# Patient Record
Sex: MALE | Race: BLACK OR AFRICAN AMERICAN | NOT HISPANIC OR LATINO
[De-identification: names, ages, dates, MRNs, and addresses within clinical notes are randomized per-mention and may not be internally consistent; named-entity substitution may affect disease eponyms.]

---

## 2017-01-31 PROBLEM — Z00.00 ENCOUNTER FOR PREVENTIVE HEALTH EXAMINATION: Status: ACTIVE | Noted: 2017-01-31

## 2017-02-01 ENCOUNTER — APPOINTMENT (OUTPATIENT)
Dept: INTERNAL MEDICINE | Facility: CLINIC | Age: 82
End: 2017-02-01

## 2018-01-24 ENCOUNTER — EMERGENCY (EMERGENCY)
Facility: HOSPITAL | Age: 83
LOS: 0 days | Discharge: HOME | End: 2018-01-24

## 2018-01-24 DIAGNOSIS — N39.0 URINARY TRACT INFECTION, SITE NOT SPECIFIED: ICD-10-CM

## 2018-01-24 DIAGNOSIS — D64.9 ANEMIA, UNSPECIFIED: ICD-10-CM

## 2018-01-24 DIAGNOSIS — K29.70 GASTRITIS, UNSPECIFIED, WITHOUT BLEEDING: ICD-10-CM

## 2018-01-24 DIAGNOSIS — E46 UNSPECIFIED PROTEIN-CALORIE MALNUTRITION: ICD-10-CM

## 2018-01-24 DIAGNOSIS — F91.9 CONDUCT DISORDER, UNSPECIFIED: ICD-10-CM

## 2018-01-24 DIAGNOSIS — K59.00 CONSTIPATION, UNSPECIFIED: ICD-10-CM

## 2018-01-24 DIAGNOSIS — Z79.899 OTHER LONG TERM (CURRENT) DRUG THERAPY: ICD-10-CM

## 2018-01-24 DIAGNOSIS — F25.9 SCHIZOAFFECTIVE DISORDER, UNSPECIFIED: ICD-10-CM

## 2018-01-24 DIAGNOSIS — E03.9 HYPOTHYROIDISM, UNSPECIFIED: ICD-10-CM

## 2018-01-24 DIAGNOSIS — D50.0 IRON DEFICIENCY ANEMIA SECONDARY TO BLOOD LOSS (CHRONIC): ICD-10-CM

## 2018-01-24 DIAGNOSIS — E78.5 HYPERLIPIDEMIA, UNSPECIFIED: ICD-10-CM

## 2018-01-24 DIAGNOSIS — F20.0 PARANOID SCHIZOPHRENIA: ICD-10-CM

## 2018-03-13 ENCOUNTER — EMERGENCY (EMERGENCY)
Facility: HOSPITAL | Age: 83
LOS: 0 days | Discharge: HOME | End: 2018-03-14
Attending: EMERGENCY MEDICINE

## 2018-03-13 VITALS
DIASTOLIC BLOOD PRESSURE: 53 MMHG | RESPIRATION RATE: 18 BRPM | TEMPERATURE: 97 F | SYSTOLIC BLOOD PRESSURE: 106 MMHG | OXYGEN SATURATION: 97 % | HEART RATE: 76 BPM

## 2018-03-13 DIAGNOSIS — R55 SYNCOPE AND COLLAPSE: ICD-10-CM

## 2018-03-13 DIAGNOSIS — F20.9 SCHIZOPHRENIA, UNSPECIFIED: ICD-10-CM

## 2018-03-13 LAB
BASOPHILS # BLD AUTO: 0.04 K/UL — SIGNIFICANT CHANGE UP (ref 0–0.2)
BASOPHILS NFR BLD AUTO: 0.4 % — SIGNIFICANT CHANGE UP (ref 0–1)
CK MB CFR SERPL CALC: 8.1 NG/ML — HIGH (ref 0.6–6.3)
EOSINOPHIL # BLD AUTO: 0.03 K/UL — SIGNIFICANT CHANGE UP (ref 0–0.7)
EOSINOPHIL NFR BLD AUTO: 0.3 % — SIGNIFICANT CHANGE UP (ref 0–8)
HCT VFR BLD CALC: 39.7 % — LOW (ref 42–52)
HGB BLD-MCNC: 12.7 G/DL — LOW (ref 14–18)
IMM GRANULOCYTES NFR BLD AUTO: 0.9 % — HIGH (ref 0.1–0.3)
LYMPHOCYTES # BLD AUTO: 0.86 K/UL — LOW (ref 1.2–3.4)
LYMPHOCYTES # BLD AUTO: 7.8 % — LOW (ref 20.5–51.1)
MCHC RBC-ENTMCNC: 26.8 PG — LOW (ref 27–31)
MCHC RBC-ENTMCNC: 32 G/DL — SIGNIFICANT CHANGE UP (ref 32–37)
MCV RBC AUTO: 83.8 FL — SIGNIFICANT CHANGE UP (ref 80–94)
MONOCYTES # BLD AUTO: 0.79 K/UL — HIGH (ref 0.1–0.6)
MONOCYTES NFR BLD AUTO: 7.2 % — SIGNIFICANT CHANGE UP (ref 1.7–9.3)
NEUTROPHILS # BLD AUTO: 9.17 K/UL — HIGH (ref 1.4–6.5)
NEUTROPHILS NFR BLD AUTO: 83.4 % — HIGH (ref 42.2–75.2)
NRBC # BLD: 0 /100 WBCS — SIGNIFICANT CHANGE UP (ref 0–0)
PLATELET # BLD AUTO: 308 K/UL — SIGNIFICANT CHANGE UP (ref 130–400)
RBC # BLD: 4.74 M/UL — SIGNIFICANT CHANGE UP (ref 4.7–6.1)
RBC # FLD: 14.4 % — SIGNIFICANT CHANGE UP (ref 11.5–14.5)
TROPONIN I SERPL-MCNC: 0.04 NG/ML — SIGNIFICANT CHANGE UP (ref 0–0.05)
WBC # BLD: 10.99 K/UL — HIGH (ref 4.8–10.8)
WBC # FLD AUTO: 10.99 K/UL — HIGH (ref 4.8–10.8)

## 2018-03-13 RX ORDER — SODIUM CHLORIDE 9 MG/ML
3 INJECTION INTRAMUSCULAR; INTRAVENOUS; SUBCUTANEOUS ONCE
Qty: 0 | Refills: 0 | Status: COMPLETED | OUTPATIENT
Start: 2018-03-13 | End: 2018-03-13

## 2018-03-13 RX ADMIN — SODIUM CHLORIDE 3 MILLILITER(S): 9 INJECTION INTRAMUSCULAR; INTRAVENOUS; SUBCUTANEOUS at 21:21

## 2018-03-13 NOTE — ED ADULT TRIAGE NOTE - CADM TRG TX PRIOR TO ARRIVAL
Patient Name: Danika Mae  Caller Name: Danika Mae  Callback Number: [phone]  Additional Info: Patient is returning the call listed below. Please advise.     Thank you,  Wesly Lua     bleeding control

## 2018-03-13 NOTE — ED PROVIDER NOTE - OBJECTIVE STATEMENT
85 yo m with h/o schizophrenia BIBA from psychiatric facility after he was found on the floor unable to get up. Pt. reports that he was stooling in the bathroom and fell weak and fell to the floor. He was unable to lift himself from the floor. Denies CP, palpitations, SOB, diaphoresis, N/V.    I have reviewed available current nursing and previous documentation of past medical, surgical, family, and/or social history.

## 2018-03-13 NOTE — ED PROVIDER NOTE - NS ED ROS FT
Review of Systems    Constitutional: (-) fever  Eyes/ENT: (-) blurry vision,(-) change in vision  Cardiovascular: (-) chest pain  Respiratory: (-) cough, (-) shortness of breath  Gastrointestinal: (-) vomiting, (-) diarrhea  Musculoskeletal: (-) neck pain, (-) back pain, (-) joint pain  Integumentary: (-) rash, (-) edema  Neurological: (-) headache, (-) altered mental status  Heme/Lymph: (-) easy bruising (-) easy bleeding  Allergic/Immunologic: (-) pruritus

## 2018-03-13 NOTE — ED PROVIDER NOTE - ATTENDING CONTRIBUTION TO CARE
85 yo M with history of schizophrenia, from [facility] sp reported syncope. Per staff, patient fell in the bathroom, unable to get up without assistance but then was ambulatory. Unclear if patient had preceding sx or true LOC as he contributes minimally to history. EKG, labs and CT were done -- no evidence of acute injury, however given presumed syncope, will transfer to obs for tele monitoring, serial trops, repeat EKG.

## 2018-03-13 NOTE — ED ADULT TRIAGE NOTE - CHIEF COMPLAINT QUOTE
Patient from [address] [place] [address]. DC from hospital today s/p mechanical fall x 2. No injury present. No blood thinners

## 2018-03-13 NOTE — ED PROVIDER NOTE - PHYSICAL EXAMINATION
Physical Exam    Vital Signs: I have reviewed the initial vital signs.  Constitutional: well-nourished, appears stated age, no acute distress  Eyes: PERRLA, EOM intact, RAPD absent, and symmetrical lids.  ENT: Neck supple with no adenopathy, moist MM.  Cardiovascular: regular rate, regular rhythm, well-perfused extremities  Respiratory: unlabored respiratory effort, clear to auscultation bilaterally  Gastrointestinal: soft, non-tender abdomen, no pulsatile mass  Musculoskeletal: supple neck, no lower extremity edema  Integumentary: warm, dry, no rash  Neurologic: awake, alert, cranial nerves II-XII grossly intact, extremities’ motor and sensory functions grossly intact  Psychiatric: A&Ox3, easily agitated, flat affect. Physical Exam    Vital Signs: I have reviewed the initial vital signs.  Constitutional: well-nourished, appears stated age, no acute distress  Eyes: PERRLA, EOM intact, RAPD absent, and symmetrical lids.  ENT: Neck supple with no adenopathy, moist MM.  Cardiovascular: regular rate, regular rhythm, well-perfused extremities  Respiratory: unlabored respiratory effort, clear to auscultation bilaterally  Gastrointestinal: soft, non-tender abdomen, no pulsatile mass  Musculoskeletal: (+) TTP along the left post. lower ribs. No midline spinal TTP. No TTP over the LE and UE. Full ROM at all joints. supple neck, no lower extremity edema  Integumentary: warm, dry, no rash  Neurologic: awake, alert, cranial nerves II-XII grossly intact, extremities’ motor and sensory functions grossly intact  Psychiatric: A&Ox3, easily agitated, flat affect.

## 2018-03-14 LAB
ALBUMIN SERPL ELPH-MCNC: 3.2 G/DL — SIGNIFICANT CHANGE UP (ref 3–5.5)
ALP SERPL-CCNC: 79 U/L — SIGNIFICANT CHANGE UP (ref 30–115)
ALT FLD-CCNC: 14 U/L — SIGNIFICANT CHANGE UP (ref 0–41)
ANION GAP SERPL CALC-SCNC: 7 MMOL/L — SIGNIFICANT CHANGE UP (ref 7–14)
AST SERPL-CCNC: 29 U/L — SIGNIFICANT CHANGE UP (ref 0–41)
BILIRUB SERPL-MCNC: 0.7 MG/DL — SIGNIFICANT CHANGE UP (ref 0.2–1.2)
BUN SERPL-MCNC: 17 MG/DL — SIGNIFICANT CHANGE UP (ref 10–20)
CALCIUM SERPL-MCNC: 10.6 MG/DL — HIGH (ref 8.5–10.1)
CHLORIDE SERPL-SCNC: 97 MMOL/L — LOW (ref 98–110)
CK SERPL-CCNC: 290 U/L — HIGH (ref 0–225)
CO2 SERPL-SCNC: 32 MMOL/L — SIGNIFICANT CHANGE UP (ref 17–32)
CREAT SERPL-MCNC: 0.8 MG/DL — SIGNIFICANT CHANGE UP (ref 0.7–1.5)
GLUCOSE SERPL-MCNC: 90 MG/DL — SIGNIFICANT CHANGE UP (ref 70–110)
POTASSIUM SERPL-MCNC: 4.6 MMOL/L — SIGNIFICANT CHANGE UP (ref 3.5–5)
POTASSIUM SERPL-SCNC: 4.6 MMOL/L — SIGNIFICANT CHANGE UP (ref 3.5–5)
PROT SERPL-MCNC: 6.7 G/DL — SIGNIFICANT CHANGE UP (ref 6–8)
SODIUM SERPL-SCNC: 136 MMOL/L — SIGNIFICANT CHANGE UP (ref 135–146)

## 2018-03-14 NOTE — ED CDU PROVIDER DISPOSITION NOTE - CLINICAL COURSE
Placed into observation for post micturition syncope. Pt has no chest pain or shortness of breath. While in observation pt was on continuous cardiac monitoring which he frequently refused. Pt agreed to out pt work up. On exam S1S2 rrr, lungs clear, neuro intact. spoke to  at [facility] who accepts pt back. Elevated calcium discussed. Pt is currently on calcium supplementation. DC supplementation and see pmd at site to repeat test.

## 2018-03-14 NOTE — ED CDU PROVIDER DISPOSITION NOTE - ATTENDING CONTRIBUTION TO CARE
History, exam, review of data, discharge, discussion with [facility]. Will print and send reports to [facility].

## 2018-03-14 NOTE — ED ADULT NURSE REASSESSMENT NOTE - NS ED NURSE REASSESS COMMENT FT1
Patient refuses assessment/vital signs. Patient is argumentative towards staff and is non compliant. Awaiting Psychiatric consult. MD aware.

## 2018-03-14 NOTE — ED CDU PROVIDER INITIAL DAY NOTE - PROGRESS NOTE DETAILS
Patient currently refusing repeat blood work and ekg. The patient is currenty refusing additional workup. In attempt to find out more about who makes his decisions, called [facility], spoke with JULIAN Manrique, states that the patient generally can come and go on his own, however was discharged from IPP at [facility] today after 2 week admission for decompensation of previously stable schizohrenia. Given this, and the fact that the patient refuses to reiterate or express understnading of risks of leaving AMA, concern that he lack's capacity. Called Dr. Shaffer to discuss this and per him, if the patient refuses to acknowledge risk of leaving, he by definition lacks capacity and then once he lacks capacity, it becomes a medical decision of risks and benefits of forcefully continuing syncope work up. At this point it seems like the risk of sedating and restraining for repeat blood work and EKG far outweighs the benefit. Patient is not trying to leave the hospital this minute, will have our SW team reach out to patient's SW and  at [facility] prior to dc from obs. Spoke to  at [facility]. Will accept pt back.

## 2018-03-14 NOTE — ED CDU PROVIDER INITIAL DAY NOTE - PHYSICAL EXAMINATION
GENERAL:  well appearing, non-toxic male in no acute distress  SKIN: skin warm, pink and dry. MMM. no signs of trauma. no ecchymosis or abrasions  HEAD: NC, AT  EYE: Normal lids, conjunctiva and sclera, PERRL, EOMI  ENT:  Airway intact. Patent oropharynx without erythema or exudate. Uvula midline. TMs clear b/l.   NECK: Neck supple. No midline cervical tenderness, meningismus, or JVD. Trachea midline  PULM: CTAB. Normal respiratory effort. No respiratory distress. No wheezes, stridor, rales or rhonchi. No retractions  CV: RRR, no M/R/G.   ABD: Soft, non-tender, non-distended.  MSK: FROM of all extremities.  No MSK tenderness. No edema, erythema, cyanosis. Distal pulses intact. no midline vertebral tenderness.   NEURO: A+Ox3, no sensory/motor deficits, CN II-XII intact. No speech slurring, pronator drift, facial asymmetry. Normal finger-to-nose  b/l. 5/5 strength throughout. Normal gait. Negative Romberg.  PSYCH: appropriate behavior, cooperative.

## 2018-03-14 NOTE — ED CDU PROVIDER INITIAL DAY NOTE - NS ED ROS FT
Constitutional: no fever, chills or generalized weakness  Eyes: no visual changes, no eye pain, no discharge or erythema, no photophobia  ENT: no URI sxs, no throat pain, no change in voice, no excessive drooling, no ear pain/discharge, no hearing changes.   Cardiovascular: s/p near syncope. no chest pain, no sob, , no palpitations, no peripheral edema  Respiratory: no cough, no shortness of breath, no h/o asthma or COPD.  Gastrointestinal: no nausea, vomiting or diarrhea. no abdominal pain. no melena or BRBPR.   Musculoskeletal: s/p fall.  no msk pain or injury. no neck pain, no back pain, no joint pain.    Integumentary: no rash or skin changes. no edema  Neurological: ? head trauma, no headache, no dizziness, no visual changes, no UE/LE weakness or paresthesias. no change in mental status. no neck pain or stiffness.   Psychiatric: + h/o schizophrenia. no suicidal or homicidal ideation or attempt. no hallucinations.   Allergic/Immunologic: no lymphadenopathy, no pruritus

## 2018-03-14 NOTE — ED CDU PROVIDER INITIAL DAY NOTE - ATTENDING CONTRIBUTION TO CARE
83 yo patient hx of schizophrenia who lives at [place] presents with syncope after urinating and BM. Felt light headed and then brief syncope. Pt denies chest pain. abd pain or back pain. No complaints. Initial work up reviewed. Pt does not want to do any further testing. He may do an echo as an out patient. On exam S1S2 rrr, lungs clear, no edema and or tenderness. neuro intact. IMP: post micturition syncope.

## 2018-03-14 NOTE — ED CDU PROVIDER INITIAL DAY NOTE - OBJECTIVE STATEMENT
83 yo male with h/o schizophrenia, from a psychiatric facility presents to the ED s/p fall. Patient explains he was in the bathroom, sitting down on toilet bowl, had a BM, stood up, felt weak and dizzy and fell to ground. States he almost passed out but denies LOC. No head trauma. no blood thinners. Currently denies pain. Denies headache, visual changes, chest pain, sob, abdominal pain, rectal bleeding, N/V/D, UE/LE weakness or paresthesias, urinary sxs, neck pain, back pain, msk pain.

## 2018-03-14 NOTE — ED ADULT NURSE NOTE - CHIEF COMPLAINT QUOTE
Patient from [address] [place] [address]. DC from hospital today s/p mechanical fall x 2. No injury present. No blood thinners

## 2018-03-27 ENCOUNTER — EMERGENCY (EMERGENCY)
Facility: HOSPITAL | Age: 83
LOS: 0 days | Discharge: HOME | End: 2018-03-27
Attending: EMERGENCY MEDICINE

## 2018-03-27 DIAGNOSIS — F20.0 PARANOID SCHIZOPHRENIA: ICD-10-CM

## 2018-03-27 DIAGNOSIS — F91.8 OTHER CONDUCT DISORDERS: ICD-10-CM

## 2018-03-27 DIAGNOSIS — F17.200 NICOTINE DEPENDENCE, UNSPECIFIED, UNCOMPLICATED: ICD-10-CM

## 2018-03-27 RX ORDER — FOLIC ACID 0.8 MG
1 TABLET ORAL
Qty: 0 | Refills: 0 | COMMUNITY

## 2018-03-27 RX ORDER — QUETIAPINE FUMARATE 200 MG/1
0 TABLET, FILM COATED ORAL
Qty: 0 | Refills: 0 | COMMUNITY

## 2018-03-27 RX ORDER — LEVOTHYROXINE SODIUM 125 MCG
0 TABLET ORAL
Qty: 0 | Refills: 0 | COMMUNITY

## 2018-03-27 RX ORDER — HALOPERIDOL DECANOATE 100 MG/ML
1 INJECTION INTRAMUSCULAR
Qty: 0 | Refills: 0 | COMMUNITY

## 2018-03-27 RX ORDER — ALENDRONATE SODIUM 70 MG/1
0 TABLET ORAL
Qty: 0 | Refills: 0 | COMMUNITY

## 2018-03-27 RX ORDER — OXCARBAZEPINE 300 MG/1
0 TABLET, FILM COATED ORAL
Qty: 0 | Refills: 0 | COMMUNITY

## 2018-03-27 RX ORDER — FAMOTIDINE 10 MG/ML
1 INJECTION INTRAVENOUS
Qty: 0 | Refills: 0 | COMMUNITY

## 2018-03-27 NOTE — CONSULT NOTE ADULT - SUBJECTIVE AND OBJECTIVE BOX
Reviewed and referred to chart notes of current and prior admissions. discussed with staff at [facility] JANET Bardales.    pt is well known to ipp. pt has been agitated episodically, aggressive ang agitated if approached and directed to do things, partial compliance with medications.  in ed he is observed to be sitting calm, intrnally preoccupied, resents interactions with staff. accepts drinks and food. not agitated or aggressive.     pt has chronic paranoid schizophrenia and is considered to be persistently mentally ill requiring supportive living environment and medications. however he will react adversley if the environment is high stimulus and strictly structured like ipp.

## 2018-03-27 NOTE — ED PROVIDER NOTE - PHYSICAL EXAMINATION
Gen: Alert, NAD, well appearing  Head: NC, AT  ENT: normal hearing  Neck: +supple  Pulm: breathing comfortably  Mskel: moving extremities  Neuro: Alert, yelling, uncooperative with exam

## 2018-03-27 NOTE — ED PROVIDER NOTE - ATTENDING CONTRIBUTION TO CARE
85 yo male h/o paranoid schizophrenia brought from [facility] for evaluation of allegedly aggressive behavior.  Patient is uncooperative sitting on a stretcher, does not answer questions.  Speaking full sentences, ambulating without assistance.  Seen by Dr Elam, who knows the patient well, this is patient's baseline, will send him back to [facility].
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
## 2018-03-27 NOTE — CONSULT NOTE ADULT - ASSESSMENT
chronic paranoid schizophrenia (CPMI)    NO [place] or benefit from ipp. return to tlr and f/u onsite.

## 2018-03-27 NOTE — ED ADULT NURSE NOTE - PSYCHOSOCIAL OBSERVED STATE
patient is non cooperative with the assessment process, patient is refusing to comply with triage/vital signs, patient is screaming at staff members when approached/avoids eye contact/uncooperative

## 2018-03-28 ENCOUNTER — EMERGENCY (EMERGENCY)
Facility: HOSPITAL | Age: 83
LOS: 0 days | Discharge: PSYCHIATRIC OR SUBSTANCE USE FACILITY | End: 2018-03-28
Attending: EMERGENCY MEDICINE

## 2018-03-28 DIAGNOSIS — F03.91 UNSPECIFIED DEMENTIA WITH BEHAVIORAL DISTURBANCE: ICD-10-CM

## 2018-03-28 DIAGNOSIS — F20.0 PARANOID SCHIZOPHRENIA: ICD-10-CM

## 2018-03-28 DIAGNOSIS — Z79.899 OTHER LONG TERM (CURRENT) DRUG THERAPY: ICD-10-CM

## 2018-03-28 DIAGNOSIS — R41.89 OTHER SYMPTOMS AND SIGNS INVOLVING COGNITIVE FUNCTIONS AND AWARENESS: ICD-10-CM

## 2018-03-28 NOTE — ED BEHAVIORAL HEALTH ASSESSMENT NOTE - SAFETY PLAN DETAILS
Please return to the ER or call 911 if you feel that you are increasingly suicidal or you feel that you are a danger to yourself or others

## 2018-03-28 NOTE — ED PROVIDER NOTE - ATTENDING CONTRIBUTION TO CARE
I have personally performed a history and physical exam on this patient and personally directed the management of the patient with PA.

## 2018-03-28 NOTE — ED ADULT NURSE REASSESSMENT NOTE - NS ED NURSE REASSESS COMMENT FT1
patient talking out loud and to himself. gets agitated and aggressive when approached. refused vital signs at this time. will make another attempt at a later time.

## 2018-03-28 NOTE — ED BEHAVIORAL HEALTH ASSESSMENT NOTE - DESCRIPTION
Patient is calm, not agitated , talking to himself GERD, Hypothyroidism , constipation, Unable to assess

## 2018-03-28 NOTE — ED BEHAVIORAL HEALTH ASSESSMENT NOTE - NS ED BHA PLAN TR BH CONTACTED FT
Message left informing Dr Tiffany de leon the plan to discharge patient from the ED and the recommendation for evaluation for severe cognitive impairment and a higher level of care. Message left informing Dr Allen of the plan to discharge patient from the ED and the recommendation for evaluation for severe cognitive impairment and a higher level of care.
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
## 2018-03-28 NOTE — ED ADULT NURSE REASSESSMENT NOTE - NS ED NURSE REASSESS COMMENT FT1
patient refused for vital signs to be rechecked despite several attempts. left via ambulance company. in no s/s of acute distress. patient gets agitated.

## 2018-03-28 NOTE — ED PROVIDER NOTE - MEDICAL DECISION MAKING DETAILS
I have discussed the discharge plan with the patient. The patient agrees with the plan, as discussed.  The patient understands Emergency Department diagnosis is a preliminary diagnosis often based on limited information and that the patient must adhere to the follow-up plan as discussed.  The patient understands that if the symptoms worsen or if prescribed medications do not have the desired/planned effect that the patient may return to the Emergency Department at any time for further evaluation and treatment. Chart finished.      I have discussed the discharge plan with the patient. The patient agrees with the plan, as discussed.  The patient understands Emergency Department diagnosis is a preliminary diagnosis often based on limited information and that the patient must adhere to the follow-up plan as discussed.  The patient understands that if the symptoms worsen or if prescribed medications do not have the desired/planned effect that the patient may return to the Emergency Department at any time for further evaluation and treatment.

## 2018-03-28 NOTE — ED BEHAVIORAL HEALTH ASSESSMENT NOTE - RISK ASSESSMENT
At this time, patient is considered a chronic risk od aggression given his At this time, patient is considered a chronic risk of aggression given his obvious cognitive impairment however he is not considered an imminent danger to himself or others and inpatient psychiatric hospitalization is not likely to be of benefit to him. Patient will benefit from a neurologic evaluation for Dementia and will need a higher level of care in a facility that will be more able to address his needs.
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
## 2018-03-28 NOTE — ED BEHAVIORAL HEALTH ASSESSMENT NOTE - REFERRAL / APPOINTMENT DETAILS
Refer to outpatient psychiatrist for medication management Refer to outpatient psychiatrist Dr Allen ( [phone]) for medication management and possibly referal for a higher level of care

## 2018-03-28 NOTE — ED BEHAVIORAL HEALTH ASSESSMENT NOTE - SUMMARY
Mr Sheriff is an 84 year old man with a history of Paranoid  Schizophrenia and Polysubstance dependence who was brought to the ER for the evaluation of agitation. psychiatry consult was called for the evaluation of aggressive behavior. Patient's behavior seem to be behavioral in nature especially since , patient becomes agitated and potentially aggressive when he is being redirected or does not have his own way.   Patient does not appear acutely psychotic , manic or depressed , neither does he have suicidal ideations, intent or plan at this time. His psychosis appears chronic and his behavior appear to be secondary to severe cognitive impairment which many be associated with his Chronic schizophrenia versus age associated cognitive decline causing a dementia. Patient's residence may be too high functioning for him and he may need a higher level of care at this time.

## 2018-03-28 NOTE — ED BEHAVIORAL HEALTH ASSESSMENT NOTE - CURRENT MEDICATION
As per staff of [facility], patient is on Haldol 5mg in AM, 15mg PM, Trileptal 300mg Q 12hrs, Seroquel 100mg P.O daily, Synthroid 0.1mg daily, Pepcid 40mg BID, Folic acid 1 mg daily, Ducolax 5mg daily, Fosamax 7mg weekly, D 3 400units daily

## 2018-03-28 NOTE — ED PROVIDER NOTE - PROGRESS NOTE DETAILS
Discussed with Dr. Hernandez, will consult Patient with PMHx of paranoid schizophrenia, however also with disorganized thinking and cognitive delay, which may represent dementia coupled with paranoid schizophrenia. In discussion with psych, may require higher level of care as in SNF. Would decline in IPP.

## 2018-03-28 NOTE — ED BEHAVIORAL HEALTH ASSESSMENT NOTE - OTHER
staff from [facility] disorganised minimally cooperative 1. Recommend neurology evaluation for severe cognitive impairment probabley dementia , 2. Recommend a higher level of care probably nursing home placement

## 2018-03-28 NOTE — ED PROVIDER NOTE - PHYSICAL EXAMINATION
CONST: Disheveled, disorganized  EYES: PERRL, EOMI, Sclera and conjunctiva clear.   CARD: Normal S1 S2; Normal rate and rhythm  RESP: Equal BS B/L, No wheezes, rhonchi or rales. No distress  SKIN: Warm, dry, no acute rashes. Good turgor  NEURO: A&Ox2, does not want to follow commands.  PSYCH: disorganized, scattered thought process.

## 2018-03-29 ENCOUNTER — EMERGENCY (EMERGENCY)
Facility: HOSPITAL | Age: 83
LOS: 0 days | Discharge: HOME | End: 2018-03-29
Attending: EMERGENCY MEDICINE

## 2018-03-29 VITALS
TEMPERATURE: 96 F | SYSTOLIC BLOOD PRESSURE: 138 MMHG | OXYGEN SATURATION: 96 % | HEART RATE: 66 BPM | RESPIRATION RATE: 18 BRPM | DIASTOLIC BLOOD PRESSURE: 65 MMHG

## 2018-03-29 VITALS
DIASTOLIC BLOOD PRESSURE: 49 MMHG | SYSTOLIC BLOOD PRESSURE: 95 MMHG | RESPIRATION RATE: 18 BRPM | OXYGEN SATURATION: 95 % | HEART RATE: 58 BPM

## 2018-03-29 DIAGNOSIS — S22.42XA MULTIPLE FRACTURES OF RIBS, LEFT SIDE, INITIAL ENCOUNTER FOR CLOSED FRACTURE: ICD-10-CM

## 2018-03-29 DIAGNOSIS — R07.81 PLEURODYNIA: ICD-10-CM
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
## 2018-03-29 DIAGNOSIS — Z79.899 OTHER LONG TERM (CURRENT) DRUG THERAPY: ICD-10-CM
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
## 2018-03-29 LAB
ALBUMIN SERPL ELPH-MCNC: 3.4 G/DL — LOW (ref 3.5–5.2)
ALP SERPL-CCNC: 101 U/L — SIGNIFICANT CHANGE UP (ref 30–115)
ALT FLD-CCNC: 14 U/L — SIGNIFICANT CHANGE UP (ref 0–41)
ANION GAP SERPL CALC-SCNC: 9 MMOL/L — SIGNIFICANT CHANGE UP (ref 7–14)
APTT BLD: 34 SEC — SIGNIFICANT CHANGE UP (ref 27–39.2)
AST SERPL-CCNC: 29 U/L — SIGNIFICANT CHANGE UP (ref 0–41)
BASOPHILS # BLD AUTO: 0.04 K/UL — SIGNIFICANT CHANGE UP (ref 0–0.2)
BASOPHILS NFR BLD AUTO: 0.4 % — SIGNIFICANT CHANGE UP (ref 0–1)
BILIRUB SERPL-MCNC: <0.2 MG/DL — SIGNIFICANT CHANGE UP (ref 0.2–1.2)
BUN SERPL-MCNC: 14 MG/DL — SIGNIFICANT CHANGE UP (ref 10–20)
CALCIUM SERPL-MCNC: 10.3 MG/DL — HIGH (ref 8.5–10.1)
CHLORIDE SERPL-SCNC: 94 MMOL/L — LOW (ref 98–110)
CO2 SERPL-SCNC: 35 MMOL/L — HIGH (ref 17–32)
CREAT SERPL-MCNC: 1.1 MG/DL — SIGNIFICANT CHANGE UP (ref 0.7–1.5)
EOSINOPHIL # BLD AUTO: 0.13 K/UL — SIGNIFICANT CHANGE UP (ref 0–0.7)
EOSINOPHIL NFR BLD AUTO: 1.4 % — SIGNIFICANT CHANGE UP (ref 0–8)
ETHANOL SERPL-MCNC: <10 MG/DL — HIGH
GLUCOSE SERPL-MCNC: 75 MG/DL — SIGNIFICANT CHANGE UP (ref 70–99)
HCT VFR BLD CALC: 40.5 % — LOW (ref 42–52)
HGB BLD-MCNC: 12.9 G/DL — LOW (ref 14–18)
IMM GRANULOCYTES NFR BLD AUTO: 0.4 % — HIGH (ref 0.1–0.3)
INR BLD: 1.11 RATIO — SIGNIFICANT CHANGE UP (ref 0.65–1.3)
LYMPHOCYTES # BLD AUTO: 1.65 K/UL — SIGNIFICANT CHANGE UP (ref 1.2–3.4)
LYMPHOCYTES # BLD AUTO: 17.7 % — LOW (ref 20.5–51.1)
MCHC RBC-ENTMCNC: 26.7 PG — LOW (ref 27–31)
MCHC RBC-ENTMCNC: 31.9 G/DL — LOW (ref 32–37)
MCV RBC AUTO: 83.7 FL — SIGNIFICANT CHANGE UP (ref 80–94)
MONOCYTES # BLD AUTO: 1.02 K/UL — HIGH (ref 0.1–0.6)
MONOCYTES NFR BLD AUTO: 10.9 % — HIGH (ref 1.7–9.3)
NEUTROPHILS # BLD AUTO: 6.45 K/UL — SIGNIFICANT CHANGE UP (ref 1.4–6.5)
NEUTROPHILS NFR BLD AUTO: 69.2 % — SIGNIFICANT CHANGE UP (ref 42.2–75.2)
NRBC # BLD: 0 /100 WBCS — SIGNIFICANT CHANGE UP (ref 0–0)
PLATELET # BLD AUTO: 303 K/UL — SIGNIFICANT CHANGE UP (ref 130–400)
POTASSIUM SERPL-MCNC: 4.4 MMOL/L — SIGNIFICANT CHANGE UP (ref 3.5–5)
POTASSIUM SERPL-SCNC: 4.4 MMOL/L — SIGNIFICANT CHANGE UP (ref 3.5–5)
PROT SERPL-MCNC: 7.4 G/DL — SIGNIFICANT CHANGE UP (ref 6–8)
PROTHROM AB SERPL-ACNC: 12 SEC — SIGNIFICANT CHANGE UP (ref 9.95–12.87)
RBC # BLD: 4.84 M/UL — SIGNIFICANT CHANGE UP (ref 4.7–6.1)
RBC # FLD: 14.5 % — SIGNIFICANT CHANGE UP (ref 11.5–14.5)
SODIUM SERPL-SCNC: 138 MMOL/L — SIGNIFICANT CHANGE UP (ref 135–146)
WBC # BLD: 9.33 K/UL — SIGNIFICANT CHANGE UP (ref 4.8–10.8)
WBC # FLD AUTO: 9.33 K/UL — SIGNIFICANT CHANGE UP (ref 4.8–10.8)

## 2018-03-29 RX ORDER — MIDAZOLAM HYDROCHLORIDE 1 MG/ML
5 INJECTION, SOLUTION INTRAMUSCULAR; INTRAVENOUS ONCE
Qty: 0 | Refills: 0 | Status: DISCONTINUED | OUTPATIENT
Start: 2018-03-29 | End: 2018-03-29

## 2018-03-29 RX ORDER — SODIUM CHLORIDE 9 MG/ML
1000 INJECTION INTRAMUSCULAR; INTRAVENOUS; SUBCUTANEOUS
Qty: 0 | Refills: 0 | Status: DISCONTINUED | OUTPATIENT
Start: 2018-03-29 | End: 2018-03-29

## 2018-03-29 RX ORDER — HALOPERIDOL DECANOATE 100 MG/ML
5 INJECTION INTRAMUSCULAR ONCE
Qty: 0 | Refills: 0 | Status: COMPLETED | OUTPATIENT
Start: 2018-03-29 | End: 2018-03-29

## 2018-03-29 RX ORDER — SODIUM CHLORIDE 9 MG/ML
3 INJECTION INTRAMUSCULAR; INTRAVENOUS; SUBCUTANEOUS EVERY 8 HOURS
Qty: 0 | Refills: 0 | Status: DISCONTINUED | OUTPATIENT
Start: 2018-03-29 | End: 2018-03-29

## 2018-03-29 RX ADMIN — HALOPERIDOL DECANOATE 5 MILLIGRAM(S): 100 INJECTION INTRAMUSCULAR at 15:47

## 2018-03-29 RX ADMIN — MIDAZOLAM HYDROCHLORIDE 5 MILLIGRAM(S): 1 INJECTION, SOLUTION INTRAMUSCULAR; INTRAVENOUS at 19:54

## 2018-03-29 RX ADMIN — HALOPERIDOL DECANOATE 5 MILLIGRAM(S): 100 INJECTION INTRAMUSCULAR at 14:39

## 2018-03-29 RX ADMIN — SODIUM CHLORIDE 150 MILLILITER(S): 9 INJECTION INTRAMUSCULAR; INTRAVENOUS; SUBCUTANEOUS at 21:46

## 2018-03-29 RX ADMIN — SODIUM CHLORIDE 3 MILLILITER(S): 9 INJECTION INTRAMUSCULAR; INTRAVENOUS; SUBCUTANEOUS at 19:54

## 2018-03-29 RX ADMIN — MIDAZOLAM HYDROCHLORIDE 5 MILLIGRAM(S): 1 INJECTION, SOLUTION INTRAMUSCULAR; INTRAVENOUS at 17:18
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
## 2018-03-29 NOTE — ED ADULT NURSE REASSESSMENT NOTE - NS ED NURSE REASSESS COMMENT FT1
patient transfer from [facility]. Patient asleep and calm at this time. NAD noted. 1:1 in place. NS infusing via left IV access. will continue to monitor.
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
## 2018-03-29 NOTE — ED ADULT NURSE NOTE - CHPI ED SYMPTOMS POS
patient is refusing assessment in the ED, patient is not answering questions and becomes combative when approached by staff members.
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
## 2018-03-29 NOTE — ED PROVIDER NOTE - CARE PLAN
Assessment and plan of treatment:	Plan: Pt takes Haldol at facility will give his dose here, imaging, reassess. Principal Discharge DX:	Rib fractures  Assessment and plan of treatment:	Plan: Pt takes Haldol at facility will give his dose here, imaging, reassess.

## 2018-03-29 NOTE — ED PROVIDER NOTE - PHYSICAL EXAMINATION
CONSTITUTIONAL: Well-developed;  in no acute distress. thin appearing male   SKIN: warm, dry  HEAD: Normocephalic; atraumatic.  EYES: no conjunctival erythema  ENT: No nasal discharge; airway clear.  NECK: Supple; non tender. full range of motion   CARD: S1, S2 normal;  Regular rate and rhythm.   RESP: No wheezes, rales or rhonchi. good air movement bilaterally, + tenderness over left anterior ribs.   ABD: soft ntnd  EXT: Normal ROM.  NEURO: Alert moving all extremities   PSYCH: not Cooperative, poor affect, hostile toward staff
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
## 2018-03-29 NOTE — ED ADULT NURSE NOTE - PAIN: PRESENCE, MLM
patient is refusing any assessment or answering any questions/non-verbal indicator of pain/discomfort not present
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
## 2018-04-01 ENCOUNTER — EMERGENCY (EMERGENCY)
Facility: HOSPITAL | Age: 83
LOS: 0 days | Discharge: HOME | End: 2018-04-01
Attending: EMERGENCY MEDICINE

## 2018-04-01 VITALS — HEART RATE: 70 BPM | RESPIRATION RATE: 20 BRPM | OXYGEN SATURATION: 96 %

## 2018-04-01 DIAGNOSIS — Z79.2 LONG TERM (CURRENT) USE OF ANTIBIOTICS: ICD-10-CM
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
## 2018-04-01 DIAGNOSIS — M79.642 PAIN IN LEFT HAND: ICD-10-CM

## 2018-04-01 DIAGNOSIS — F20.0 PARANOID SCHIZOPHRENIA: ICD-10-CM

## 2018-04-01 DIAGNOSIS — T74.11XA ADULT PHYSICAL ABUSE, CONFIRMED, INITIAL ENCOUNTER: ICD-10-CM

## 2018-04-01 DIAGNOSIS — Z79.83 LONG TERM (CURRENT) USE OF BISPHOSPHONATES: ICD-10-CM

## 2018-04-01 DIAGNOSIS — F20.9 SCHIZOPHRENIA, UNSPECIFIED: ICD-10-CM
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
## 2018-04-01 DIAGNOSIS — Z79.899 OTHER LONG TERM (CURRENT) DRUG THERAPY: ICD-10-CM

## 2018-04-01 DIAGNOSIS — Z79.84 LONG TERM (CURRENT) USE OF ORAL HYPOGLYCEMIC DRUGS: ICD-10-CM
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
## 2018-04-01 DIAGNOSIS — K21.9 GASTRO-ESOPHAGEAL REFLUX DISEASE WITHOUT ESOPHAGITIS: ICD-10-CM
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
## 2018-04-01 NOTE — ED BEHAVIORAL HEALTH ASSESSMENT NOTE - HPI (INCLUDE ILLNESS QUALITY, SEVERITY, DURATION, TIMING, CONTEXT, MODIFYING FACTORS, ASSOCIATED SIGNS AND SYMPTOMS)
Mr Sheriff is an 84 yr old  man, single, has no children, resides at [facility] at [facility] unemployed,  with a history of Paranoid Schizophrenia and Polysubstance dependence who was brought to the ER for the evaluation of agitation. Psychiatry consult was called for the evaluation of aggressive behavior. Patient has had many similar presentations recently. On approach, patient was observed to be calm and sleeping. He was easily arousable but did not engage in the psychiatric interview. Collateral information was obtained from staff Lisette at [facility] ( [phone]). She states that the patient becomes agitated very easily when redirected. Patient was recently discharged from [facility] on March 13, and the staff states that he has been intermittently compliant with medication.

## 2018-04-01 NOTE — ED PROVIDER NOTE - PHYSICAL EXAMINATION
CONSTITUTIONAL: Well-developed; well-nourished; in no acute distress.   SKIN: warm, dry  HEAD: Normocephalic; atraumatic.  EYES: PERRL, no conjunctival erythema  ENT: No nasal discharge; airway clear.  NECK: Supple; non tender.  CARD: S1, S2 normal;  Regular rate and rhythm.   RESP: No wheezes, rales or rhonchi.  ABD: soft ntnd  EXT: Normal ROM.    NEURO: ambulating unassisted, moving all extremities.   PSYCH: somewhat Cooperative, dec affect CONSTITUTIONAL: Well-developed; well-nourished; in no acute distress.   SKIN: warm, dry  HEAD: Normocephalic; atraumatic.  EYES: PERRL, no conjunctival erythema  ENT: No nasal discharge; airway clear.  NECK: Supple; non tender.  CARD: S1, S2 normal;  Regular rate and rhythm.   RESP: No wheezes, rales or rhonchi.  ABD: soft ntnd  EXT: Normal ROM.    NEURO: A&OX3, ambulating unassisted, moving all extremities.   PSYCH: somewhat Cooperative, dec affect
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
## 2018-04-01 NOTE — ED BEHAVIORAL HEALTH ASSESSMENT NOTE - REFERRAL / APPOINTMENT DETAILS
Refer to outpatient psychiatrist Dr Allen ( [phone]) for medication management and possibly referal for a higher level of care
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
## 2018-04-01 NOTE — ED BEHAVIORAL HEALTH ASSESSMENT NOTE - SUMMARY
Mr Sheriff is an 84 year old man with a history of Paranoid  Schizophrenia and Polysubstance dependence who was brought to the ER for the evaluation of agitation. psychiatry consult was called for the evaluation of aggressive behavior. Patient's behavior seem to be behavioral in nature especially since , patient becomes agitated and potentially aggressive when he is being redirected or does not have his own way.   Patient does not appear acutely psychotic , manic or depressed , neither does he have suicidal ideations, intent or plan at this time. His psychosis appears chronic and his behavior appear to be secondary to severe cognitive impairment which many be associated with his Chronic schizophrenia versus age associated cognitive decline causing a dementia. Patient's residence may be too high functioning for him and he may need a higher level of care at this time.    A structured and high stimulus environment like IPP may worsen the patient at this time. Case discussed with Dr. Elam.

## 2018-04-01 NOTE — ED ADULT NURSE REASSESSMENT NOTE - NS ED NURSE REASSESS COMMENT FT1
patient awake. not answering questions except to deny suicidal/homicidal ideation. no acute distress noted. safety maintained

## 2018-04-01 NOTE — ED BEHAVIORAL HEALTH ASSESSMENT NOTE - CURRENT MEDICATION
As per staff of [facility], patient is on Haldol 5mg in AM, 15mg PM, Trileptal 300mg Q 12hrs, Seroquel 100mg P.O daily, Synthroid 0.1mg daily, Pepcid 40mg BID, Folic acid 1 mg daily, Ducolax 5mg daily, Fosamax 7mg weekly, D 3 400units daily
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
## 2018-04-01 NOTE — ED ADULT NURSE REASSESSMENT NOTE - NS ED NURSE REASSESS COMMENT FT1
Pt noncompliant on examination, refusing to answer questions. Pt only answered two questions from RN - denies suicidal ideations, denies homicidal ideations. Pt telling RN "shut the fuck up, motherfucker"

## 2018-04-09 NOTE — ED ADULT NURSE NOTE - NS ED NURSE DC TEACHING
"HPI:    10/24/2017 - Here for follow up, SOB, GOMEZ seem to be a little worse has required increased OI2 (5 LPM with exertion).  Has also noted increased edema which has not responded to diuretics.  He has appointment with Dr Jennings (Thoracic surgery at [facility]) for evaluation.  D/W pt and sister.  Will try to add zaroxylyn to see if we get a better diuresis.    1/2/2018 - Here for follow up.  Has been evaluated and is being considered for pulmonary artery thromboembolectomy.  Had PFT which show no obstruction, mild air trapping and a minimal decrease in DLCO.  No new complaints or iussues.  Will send PFT to surgeon.  Told pt and sister to contact his office to make sure that follow up is arranged.    4/9/2018 - Here for follow up, had thrombectomy by Dr Jennings (hospitalized 3/13 - 3/19), since being DC is doing well, now using O2 with his Trilogy machine, sats ranging low - mid 90's.  To see surgery soon and "get a battery of tests".  No new issues reported.  Feels less SOB but still with some chest soreness.  Has lost some weight as well.      Home Oxygen    Qualifier - O2 sat 88% on room air      Date - 7/18/2017                   + Exertion      O2 sat 94 on 2 LPM via nasal cannuli    DX - pulmonary embolism, obesity hypoventilation    Face to face visit with pt concerning the need for O2 therapy, all questions answered.  I stressed the need for compliance.  Pt to call with any questions.    NIPPV for Obesity Hypoventilation    Ordering nocturnal volume ventilator PRN use to reduce the risk of exacerbation.  Home BiPAP in sufficient due to the severity of the condition.  Obesity hypoventilation is the cause of the chronic respiratory failure.            Medications    Current Outpatient Prescriptions:     aspirin (ECOTRIN) 81 MG EC tablet, Take 1 tablet (81 mg total) by mouth once daily., Disp: , Rfl: 0    atorvastatin (LIPITOR) 20 MG tablet, Take 20 mg by mouth once daily. , Disp: , Rfl:     carbamazepine " (TEGRETOL) 200 mg tablet, Take 200 mg by mouth once daily. , Disp: , Rfl:     cetirizine (ZYRTEC) 10 MG tablet, Take 10 mg by mouth once daily. , Disp: , Rfl:     citalopram (CELEXA) 40 MG tablet, Take 40 mg by mouth once daily. , Disp: , Rfl:     furosemide (LASIX) 20 MG tablet, Take 1 tablet (20 mg total) by mouth 2 (two) times daily. Take one tablet by mouth twice daily for 7 days then decrease to once daily, Disp: 60 tablet, Rfl: 0    metoprolol tartrate (LOPRESSOR) 25 MG tablet, Take 1 tablet (25 mg total) by mouth 2 (two) times daily., Disp: 60 tablet, Rfl: 11    omeprazole (PRILOSEC) 20 MG capsule, Take 20 mg by mouth once daily. , Disp: , Rfl:     oxyCODONE (ROXICODONE) 5 MG immediate release tablet, Take 1 tablet (5 mg total) by mouth every 4 (four) hours as needed., Disp: 60 tablet, Rfl: 0    potassium chloride SA (K-DUR,KLOR-CON) 20 MEQ tablet, Take 1 tablet (20 mEq total) by mouth 2 (two) times daily. Take one tablet by mouth twice daily for 7 days then decrease to once daily, Disp: 60 tablet, Rfl: 0    VENTOLIN HFA 90 mcg/actuation inhaler, TAKE 1 PUFF EVERY 6 HOURS AS NEEDED FOR WHEEZING/SOB, Disp: , Rfl: 6    warfarin (COUMADIN) 3 MG tablet, Take 1 tablet (3 mg total) by mouth Daily., Disp: 30 tablet, Rfl: 11    Allergies  Review of patient's allergies indicates:   Allergen Reactions    Clopidogrel        Social History    History   Smoking Status    Never Smoker   Smokeless Tobacco    Never Used     ETOH - 0 drinks per week.  History   Drug Use No     Occupation - not working, worked at [employer]    Family History  No family history on file.    ROS  Review of Systems   Constitutional: Positive for malaise/fatigue. Negative for chills, diaphoresis, fever and weight loss.   HENT: Negative for congestion.    Eyes: Negative for pain.   Respiratory: Positive for shortness of breath. Negative for cough, hemoptysis, sputum production, wheezing and stridor.    Cardiovascular: Positive for leg swelling.  "Negative for chest pain, palpitations, orthopnea, claudication and PND.   Gastrointestinal: Negative for abdominal pain, constipation, diarrhea, heartburn, nausea and vomiting.   Genitourinary: Negative for dysuria, frequency and urgency.   Musculoskeletal: Negative for falls and myalgias.   Neurological: Negative for sensory change, focal weakness and weakness.   Psychiatric/Behavioral: Negative for depression. The patient is not nervous/anxious.        Physical Exam    Vitals:    04/09/18 1136   BP: 110/70   Pulse: 96   SpO2: (!) 91%   Weight: (!) 144.7 kg (319 lb)   Height: 5' 7" (1.702 m)       Physical Exam   Constitutional: He is oriented to person, place, and time. He appears well-developed and well-nourished. No distress.   HENT:   Head: Normocephalic and atraumatic.   Nose: Nose normal.   Mouth/Throat: Oropharynx is clear and moist.   Eyes: EOM are normal. Pupils are equal, round, and reactive to light.   Neck: Normal range of motion. Neck supple. No JVD present. No tracheal deviation present. No thyromegaly present.   Cardiovascular: Normal rate, regular rhythm, normal heart sounds and intact distal pulses.  Exam reveals no gallop and no friction rub.    No murmur heard.  Pulmonary/Chest: Effort normal and breath sounds normal. No stridor. No respiratory distress. He has no wheezes. He has no rales. He exhibits no tenderness.   Abdominal: Soft. Bowel sounds are normal. He exhibits no distension.   obese   Musculoskeletal: Normal range of motion. He exhibits edema. He exhibits no tenderness.   Lymphadenopathy:     He has no cervical adenopathy.   Neurological: He is alert and oriented to person, place, and time. He has normal reflexes. No cranial nerve deficit.   Skin: He is not diaphoretic.   Psychiatric: He has a normal mood and affect. His behavior is normal.   Nursing note and vitals reviewed.      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          Other:

## 2018-06-12 ENCOUNTER — APPOINTMENT (OUTPATIENT)
Dept: PULMONOLOGY | Facility: CLINIC | Age: 83
End: 2018-06-12

## 2018-06-25 NOTE — ED ADULT NURSE NOTE - NS ED NURSE DISCH DISPOSITION
Cyanotic changes of the tip of the left 1st toe  Unclear if this is an embolic event or related to trauma  She has a palpable dorsalis pedis pulse bilaterally  She does have risk factors of hyperlipidemia and hypertension and a family history of atrial fibrillation  EKG in the office which does not show any evidence of atrial fibrillation  I will obtain a lower extremity arterial duplex and an abdominal aortic duplex to rule out any aneurysmal disease that could be a potential source of embolism  In the meantime she should consider 81 milligram aspirin daily  I will see her back after these tests are performed  Discharged

## 2018-06-26 ENCOUNTER — EMERGENCY (EMERGENCY)
Facility: HOSPITAL | Age: 83
LOS: 0 days | Discharge: HOME | End: 2018-06-26
Attending: EMERGENCY MEDICINE | Admitting: EMERGENCY MEDICINE

## 2018-06-26 DIAGNOSIS — F20.0 PARANOID SCHIZOPHRENIA: ICD-10-CM

## 2018-06-26 DIAGNOSIS — F20.9 SCHIZOPHRENIA, UNSPECIFIED: ICD-10-CM

## 2018-06-26 DIAGNOSIS — R45.6 VIOLENT BEHAVIOR: ICD-10-CM

## 2018-06-26 DIAGNOSIS — Z79.899 OTHER LONG TERM (CURRENT) DRUG THERAPY: ICD-10-CM

## 2018-06-26 DIAGNOSIS — K21.9 GASTRO-ESOPHAGEAL REFLUX DISEASE WITHOUT ESOPHAGITIS: ICD-10-CM

## 2018-06-26 DIAGNOSIS — R41.89 OTHER SYMPTOMS AND SIGNS INVOLVING COGNITIVE FUNCTIONS AND AWARENESS: ICD-10-CM

## 2018-06-26 NOTE — ED BEHAVIORAL HEALTH ASSESSMENT NOTE - CURRENT MEDICATION
Seroquel 200mg oral 4PM, Haldol dec 100mg IM [id number]  Haldol 10mg oral bid, Trazodone 50mg oral qhs  Oxcarbazepine 300mg oral bid, Synthroid 100mcg oral 7AM  Fosamax 70 qmonday, Pepcid 20mg oral bid  Folic acid 1mg oral qAM, Calcium w Vit D at 9AM
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
## 2018-06-26 NOTE — ED BEHAVIORAL HEALTH ASSESSMENT NOTE - HPI (INCLUDE ILLNESS QUALITY, SEVERITY, DURATION, TIMING, CONTEXT, MODIFYING FACTORS, ASSOCIATED SIGNS AND SYMPTOMS)
85 year old male domiciled at psychiatric residence ([facility]), -American male with history of paranoid schizophrenia and multiple prior IPP admissions (most recently 6/5-6/21/18) sent to hospital by residence for agitation, spitting at staff. On approach, patient shouts, "What the fuck you want, four eyes?" Patient then started clearing his throat and attempted to spit on examiner. Patient observed to be calm when others are not attempting to engage him. Patient observed to be quietly sitting on the bed and not speaking to others. Per report from staff, patient only spat at others when they were attempting to engage him. Per staff, reports from [facility] were that patient was not agitated when left alone but became irritable when others     Called [facility] at [phone], spoke with Elizabeth: last discharged from psychiatric hospital on 6/21 (hospitalized from 6/5-6/21). Hospitalized for confusion, verbally aggressive and threatening. Patient refusing neurology workup and physical exam. At baseline patient is "pleasant" but has been agitated for the past 3 days (was "OK" for two days after returning from hospital). Patient's medication regimen is as follows:  Seroquel 200mg oral 4PM  Haldol dec 100mg IM [id number]  Haldol 10mg oral bid  Trazodone 50mg oral qhs  Oxcarbazepine 300mg oral bid  Synthroid 100mcg oral 7AM  Fosamax 70 qmonday  Pepcid 20mg oral bid  Folic acid 1mg oral qAM  Calcium w Vit D at 9AM    Multiple attempts to call Dr. Montes were unsuccessful. Attempted at [phone] and at [phone].    Called [facility] IPP at [phone] - unable to reach anyone involved in patient's care or anyone who could provide further information.

## 2018-06-26 NOTE — ED BEHAVIORAL HEALTH ASSESSMENT NOTE - SAFETY PLAN DETAILS
If agitation significantly worsens or patient begins to become an imminent risk to cause significant harm to himself or others, please return to ED

## 2018-06-26 NOTE — ED BEHAVIORAL HEALTH ASSESSMENT NOTE - DESCRIPTION
Patient spitting at PCAs and other staff, agitated, cursing at staff. GERD, hypothyroidism domiciled at [facility], -American, unknown history of employment, incarceration, marital status

## 2018-06-26 NOTE — ED BEHAVIORAL HEALTH ASSESSMENT NOTE - CASE SUMMARY
Mr Sheriff is an 84 year old man with a long his tory of Paranoid Schizophrenia who presented to the ER for agitatied behavior at his residence. Patient's presentation appears behavioral in nature and does not seem to be as a result of a decompensation of his psychiatric illness. It is also likely that patient has some cognitive impairment which is most probably  contributing to his agitation .   At this time, patient is not considered an imminent danger to himself or others and does not need inpatient psychiatric hospitalization. Patient will however benefit  from a Neurological evaluation for possible dementia and will possibly benefit from the exploration of a higher level of care . Patient will also benefit from a re-evaluation of the benefit of 2 antipsychotics and a mood stabilizer given the likelihood that patient’s recurrent behavior may be as a result of potential cognitive impairment.

## 2018-06-26 NOTE — ED BEHAVIORAL HEALTH ASSESSMENT NOTE - DETAILS
spitting on others Discussed care with [facility] did not have contact information for [facility] IPP, told to call Dr. Montes to get further information, attempts to call Dr. Montes were unsuccessful [facility] 6/5-6/21/18

## 2018-06-26 NOTE — ED PROVIDER NOTE - ATTENDING CONTRIBUTION TO CARE
84 m PMh schizophrenia, BIBEMS from [facility] for agitated behavior, spitting at and yelling at staff. No falls, trauma, other physical aggression, SI, HI, AVH, alcohol or drug use.   Exam: NAD, NCAT, HEENT: mmm, EOMI, PERRLA, Neck: supple, nontender, nl ROM, Heart: RRR, no murmur, Lungs: BCTA, no signs of increased WOB, Abd: NTND, no guarding or rebound, no hernia palpated, no CVAT. MSK: chest, back, and ext nontender, nl rom, no deformity. Neuro: A&Ox3, CN II-XII intact, normal strength 5/5 all 4 ext, nl sensation throughout, shouting fluent speech, normal coordination. Normal gait.  A/P: eval by psych for safety for discharge. No other acute pathology apparent. observe on 1:1. Compliant with verbal deescalation.

## 2018-06-26 NOTE — ED ADULT TRIAGE NOTE - CHIEF COMPLAINT QUOTE
Pt BIBKHALIF from [address] [facility], pt uncooperative & combative, attempting to harm others, refusing VS, Charge JULIAN Quintana notified. 1:1 observation initiated.

## 2018-06-26 NOTE — ED ADULT NURSE NOTE - OBJECTIVE STATEMENT
Patient sent from [address] [place] [address] for aggression toward staff and other patients. As per transfer papers patient was spitting at staff. Upon arrival patient yelling racial slurs at staff. Cooperative with changing into gown. Placed on 1:1 sit

## 2018-06-26 NOTE — ED BEHAVIORAL HEALTH ASSESSMENT NOTE - RISK ASSESSMENT
Patient has elevated chronic risk for self-harm given long history of psychiatric diagnosis and reported history of polysubstance use. Patient is spitting at others at this time, however, patient observed to be sitting calmly when not engaged by others. Patient does have chronic elevated risk of self-harm that can be addressed at psychiatric residence. At this time, patient is not considered to be an imminent risk to cause significant harm to himself or others.

## 2018-06-26 NOTE — ED PROVIDER NOTE - OBJECTIVE STATEMENT
85 yo M pmhx of paranoid schizophrenia, BIBA from [facility] for not taking his medications and being loud and combative to the staff. NO fever, no chills, no headache, no nausea, no vomiting, no diarrhea, no chest pain, no back pain, no abdominal pain. Patient answering questions. Ptaient a/o x 3

## 2018-06-26 NOTE — ED BEHAVIORAL HEALTH ASSESSMENT NOTE - SUMMARY
85 year old male domiciled at [facility], -American with history of paranoid schizophrenia and multiple IPP admissions (most recently from 6/5-6/21/18 at [facility]) sent in by psychiatric residence for agitation, spitting at others. Patient was irritable and threatening to spit during exam (and attempted to spit). However, when patient not engaged by others, patient was observed to be calm, not agitated with others. Patient has history of schizophrenia and at prior presentations to this facility, follow-up with neurology was recommended to address likelihood that patient was developing neurocognitive decline (commonly seen in patients with long history of schizophrenia). Patient did not follow-up and has been refusing medical follow-up as well. Patient was recently discharged from [facility] IP, and as of 3 days ago patient was at reported baseline - calm. Patient has had worsening agitation for 3 days. Attempts to contact [facility] IPP were answered, however, there has been no call back in 30 minutes and examiner was unable to speak with anyone familiar with patient's case.    No objective signs of EPS/akathesia were observed, however, patient is currently receiving an equivalent dose of Haldol 30mg daily. Indication for Seroquel is questionable at this time given that patient is on high dose of antipsychotic and a mood stabilizer     Patient's current presentation is primarily behavioral and likely secondary to neurocognitive decline as opposed to psychiatric decompensation. Patient would likely benefit from higher level of care than [facility], possible specialized level of care for patient's with significant neurocognitive decline. Change in environment (IPP to [facility]) and possible medical causes of delirium should be considered in the outpatient setting.     We recommend:  1. Behavioral modifications and environmental precautions  2. Follow-up with outpatient psychiatrist to re-evaluate need for dual antipsychotic therapy (especially Seroquel) given potential risks of adverse effects of dose, and also re-evaluate for possible benefit of benztropine in this patient  3. Consider SSRI (Celexa) for treatment of agitation in elderly patients with dementia as this has been studied and shown to have beneficial effects in reducing agitation  4. Please consider evaluation of medical causes of altered mental status as this patient is reported to have an acute change in mental status approx 2-3 days ago.  5. We recommend higher level of care due to cognitive impairment

## 2018-07-13 ENCOUNTER — APPOINTMENT (OUTPATIENT)
Dept: GASTROENTEROLOGY | Facility: CLINIC | Age: 83
End: 2018-07-13

## 2018-07-30 ENCOUNTER — OUTPATIENT (OUTPATIENT)
Dept: OUTPATIENT SERVICES | Facility: HOSPITAL | Age: 83
LOS: 1 days | Discharge: HOME | End: 2018-07-30

## 2018-07-30 PROBLEM — K21.9 GASTRO-ESOPHAGEAL REFLUX DISEASE WITHOUT ESOPHAGITIS: Chronic | Status: ACTIVE | Noted: 2018-04-01

## 2018-07-30 PROBLEM — F20.9 SCHIZOPHRENIA, UNSPECIFIED: Chronic | Status: ACTIVE | Noted: 2018-03-14

## 2018-07-31 DIAGNOSIS — H90.8 MIXED CONDUCTIVE AND SENSORINEURAL HEARING LOSS, UNSPECIFIED: ICD-10-CM

## 2019-11-27 ENCOUNTER — OUTPATIENT (OUTPATIENT)
Dept: OUTPATIENT SERVICES | Facility: HOSPITAL | Age: 84
LOS: 1 days | Discharge: HOME | End: 2019-11-27

## 2019-11-29 DIAGNOSIS — H91.90 UNSPECIFIED HEARING LOSS, UNSPECIFIED EAR: ICD-10-CM

## 2020-01-06 ENCOUNTER — OUTPATIENT (OUTPATIENT)
Dept: OUTPATIENT SERVICES | Facility: HOSPITAL | Age: 85
LOS: 1 days | Discharge: HOME | End: 2020-01-06

## 2020-01-07 DIAGNOSIS — H91.90 UNSPECIFIED HEARING LOSS, UNSPECIFIED EAR: ICD-10-CM

## 2020-01-28 ENCOUNTER — OUTPATIENT (OUTPATIENT)
Dept: OUTPATIENT SERVICES | Facility: HOSPITAL | Age: 85
LOS: 1 days | Discharge: HOME | End: 2020-01-28

## 2020-01-30 DIAGNOSIS — H90.3 SENSORINEURAL HEARING LOSS, BILATERAL: ICD-10-CM

## 2020-02-14 NOTE — ED PROVIDER NOTE - MUSCULOSKELETAL, MLM
You may receive a survey about your visit with us today. The feedback from our patients helps us identify what is working well and where the service to all patients can be enhanced. Thank you! Spine appears normal, range of motion is not limited, no muscle or joint tenderness

## 2020-02-20 ENCOUNTER — OUTPATIENT (OUTPATIENT)
Dept: OUTPATIENT SERVICES | Facility: HOSPITAL | Age: 85
LOS: 1 days | Discharge: HOME | End: 2020-02-20

## 2020-02-20 DIAGNOSIS — H90.3 SENSORINEURAL HEARING LOSS, BILATERAL: ICD-10-CM

## 2020-06-01 ENCOUNTER — OUTPATIENT (OUTPATIENT)
Dept: OUTPATIENT SERVICES | Facility: HOSPITAL | Age: 85
LOS: 1 days | Discharge: HOME | End: 2020-06-01

## 2020-06-01 DIAGNOSIS — H90.3 SENSORINEURAL HEARING LOSS, BILATERAL: ICD-10-CM

## 2020-06-15 ENCOUNTER — OUTPATIENT (OUTPATIENT)
Dept: OUTPATIENT SERVICES | Facility: HOSPITAL | Age: 85
LOS: 1 days | Discharge: HOME | End: 2020-06-15

## 2020-06-15 DIAGNOSIS — H90.3 SENSORINEURAL HEARING LOSS, BILATERAL: ICD-10-CM

## 2020-06-18 ENCOUNTER — OUTPATIENT (OUTPATIENT)
Dept: OUTPATIENT SERVICES | Facility: HOSPITAL | Age: 85
LOS: 1 days | Discharge: HOME | End: 2020-06-18

## 2020-06-18 DIAGNOSIS — H90.3 SENSORINEURAL HEARING LOSS, BILATERAL: ICD-10-CM

## 2020-06-19 ENCOUNTER — OUTPATIENT (OUTPATIENT)
Dept: OUTPATIENT SERVICES | Facility: HOSPITAL | Age: 85
LOS: 1 days | Discharge: HOME | End: 2020-06-19

## 2020-06-19 DIAGNOSIS — H90.3 SENSORINEURAL HEARING LOSS, BILATERAL: ICD-10-CM
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
## 2021-07-19 NOTE — ED BEHAVIORAL HEALTH ASSESSMENT NOTE - AFFECT QUALITY
Patient for monthly catheter change per provider. Existing catheter balloon deflated and removed without difficulty. Patient prepped in usual sterile fashion. 2% lidocaine urojet used prior to insertion of 18F coude catheter. 40 mL of sterile normal saline instilled into the bladder and about 45 mL returned.Balloon filled with 10 mL sterile water. Catheter secured with new stat lock, connected to tubing and leg bag. Patient tolerated procedure well and will follow up as needed/directed or in 4 weeks for catheter change.     Irritable

## 2022-06-27 NOTE — ED ADULT TRIAGE NOTE - CADM TRG EMS AGENCY
[facility] Hydroxychloroquine Pregnancy And Lactation Text: This medication has been shown to cause fetal harm but it isn't assigned a Pregnancy Risk Category. There are small amounts excreted in breast milk.

## 2022-09-29 NOTE — ED ADULT TRIAGE NOTE - DIRECT TO ROOM CARE INITIATED:
----- Message from Elisa Casillas LPN sent at 9/28/2022  1:27 PM CDT -----    ----- Message -----  From: Samantha Philip  Sent: 9/28/2022   1:08 PM CDT  To: Elisa Casillas LPN    Patient was wondering if he can get his meds transferred to [facility] on 19 since [facility] has closed. He said all meds that he got when he was here on the 14th of September.    [phone]      
27-Mar-2018 10:14

## 2023-01-09 ENCOUNTER — APPOINTMENT (OUTPATIENT)
Dept: CT IMAGING | Facility: CLINIC | Age: 88
End: 2023-01-09

## 2023-11-21 NOTE — ED ADULT NURSE REASSESSMENT NOTE - NS ED NURSE REASSESS COMMENT FT1
31 yo M w/ hx of IVDU and MRSA bacteremia presenting w/ left buttock/hip pain x 1 week. Has significant history for multiple wounds and abscesses. Admitted in October and found to have MRSA bacteremia; unclear if patient obtained outpatient abx for treatment after eloping from hospital. Febrile to 101F in ED. No leukocytosis. CT A/P w/ concern for osteomyelitis of the S1 vertebral body, septic arthritis of the L sacroiliac joint and a left iliopsoas muscle abscess. Patient received IVFs, vanc/zosyn and admitted to  for further management.     - Blood cultures NGTD  - Infectious disease consulted; appreciate recs   - Empiric vancomycin and zosyn ordered  - MRI pelvis and lumbar spine with OM/spondylodiscitis of the S1 vertebral body extending from L5-S2, phlegmon within the spinal canal extending from L5-S2 resulting in moderate spinal canal narrowing and a bilobed left psoas abscess measuring up to 10cm which may be contiguous with the L5-S1 disc space.   - IR consulted for aspiration of L psoas abscess; intra-operative cultures ordered.   - NRSY consulted for epidural phegmon   patient sent in from [facility] via [facility] ambulance, as per EMS patient has been threatening staff members, has not taken his medications, on arrival to ED patient is sitting up on side of stretcher, is refusing to let staff members check his ID bracelet, pt states, "its a fake fuck you" , patient is refusing vital sign assessment, pt states, "I refuse" is yelling at staff members and has physically aggressive gestures when approached, despite repeated attempts from staff members to reorient patient and educated on medical protocol, patient continues to refuse interactions with staff members.

## 2024-04-25 NOTE — ED ADULT NURSE NOTE - PSH
Patient presents for one week EKG post Flecainide start in hospital for atrial fibrillation.  Currently on 100mg bid, also metoprolol succ 200mg qd.      /62  HR 48/NSR    Patient is feeling well except for increase in LUZ which is being managed by general cardiology at this time.     Dr. Diego did review EKG in office today and patient was instructed to keep all meds the same and follow-up with Jovanni Tillman PA-C when scheduled.  
No significant past surgical history

## 2025-07-30 NOTE — ED BEHAVIORAL HEALTH ASSESSMENT NOTE - NS ED BHA PLAN
Detail Level: Simple Do Not Use If Visit Has Modifier 25 And Other Service Is Not A Preventive Service, Immunization, Or Annual Wellness Visit: : Visit complexity inherent to evaluation and management associated with medical care services that serve as the continuing focal point for all needed health care services and/or with medical care services that are part of ongoing care related to a patient’s single, serious, or complex chronic condition Indication: Provided medical care services as part of ongoing care related to the patient's single, serious or complex chronic condition. Treat and Release